# Patient Record
Sex: MALE | URBAN - METROPOLITAN AREA
[De-identification: names, ages, dates, MRNs, and addresses within clinical notes are randomized per-mention and may not be internally consistent; named-entity substitution may affect disease eponyms.]

---

## 2021-04-07 ENCOUNTER — APPOINTMENT (RX ONLY)
Dept: URBAN - METROPOLITAN AREA CLINIC 156 | Facility: CLINIC | Age: 61
Setting detail: DERMATOLOGY
End: 2021-04-07

## 2021-04-07 DIAGNOSIS — H61.03 CHONDRITIS OF EXTERNAL EAR: ICD-10-CM

## 2021-04-07 PROBLEM — H61.032 CHONDRITIS OF LEFT EXTERNAL EAR: Status: ACTIVE | Noted: 2021-04-07

## 2021-04-07 PROCEDURE — ? COUNSELING

## 2021-04-07 PROCEDURE — ? LIQUID NITROGEN

## 2021-04-07 PROCEDURE — ? SEPARATE AND IDENTIFIABLE DOCUMENTATION

## 2021-04-07 PROCEDURE — 99202 OFFICE O/P NEW SF 15 MIN: CPT | Mod: 25

## 2021-04-07 PROCEDURE — 17110 DESTRUCTION B9 LES UP TO 14: CPT

## 2021-04-07 PROCEDURE — ? RECOMMENDATIONS

## 2021-04-07 ASSESSMENT — LOCATION SIMPLE DESCRIPTION DERM: LOCATION SIMPLE: LEFT EAR

## 2021-04-07 ASSESSMENT — LOCATION DETAILED DESCRIPTION DERM
LOCATION DETAILED: LEFT SUPERIOR HELIX
LOCATION DETAILED: LEFT SCAPHA

## 2021-04-07 ASSESSMENT — LOCATION ZONE DERM: LOCATION ZONE: EAR

## 2021-04-07 NOTE — PROCEDURE: RECOMMENDATIONS
Recommendations (Free Text): Sleeping on a pressure pillow at night and rotating sides when sleeping.
Detail Level: Zone
Recommendation Preamble: The following recommendations were made during the visit:
Render Risk Assessment In Note?: no

## 2021-04-07 NOTE — PROCEDURE: LIQUID NITROGEN
Include Z78.9 (Other Specified Conditions Influencing Health Status) As An Associated Diagnosis?: No
Medical Necessity Clause: This procedure was medically necessary because the lesions that were treated were:
Consent: The patient's consent was obtained including but not limited to risks of crusting, scabbing, blistering, scarring, darker or lighter pigmentary change, recurrence, incomplete removal and infection.
Detail Level: Simple
Post-Care Instructions: I reviewed with the patient in detail post-care instructions. Patient is to wear sunprotection, and avoid picking at any of the treated lesions. Pt may apply Vaseline to crusted or scabbing areas.
Medical Necessity Information: It is in your best interest to select a reason for this procedure from the list below. All of these items fulfill various CMS LCD requirements except the new and changing color options.